# Patient Record
Sex: FEMALE | Race: WHITE | NOT HISPANIC OR LATINO
[De-identification: names, ages, dates, MRNs, and addresses within clinical notes are randomized per-mention and may not be internally consistent; named-entity substitution may affect disease eponyms.]

---

## 2020-07-20 ENCOUNTER — TRANSCRIPTION ENCOUNTER (OUTPATIENT)
Age: 75
End: 2020-07-20

## 2020-07-20 DIAGNOSIS — Z01.812 ENCOUNTER FOR PREPROCEDURAL LABORATORY EXAMINATION: ICD-10-CM

## 2020-07-20 PROBLEM — Z00.00 ENCOUNTER FOR PREVENTIVE HEALTH EXAMINATION: Noted: 2020-07-20

## 2020-08-04 ENCOUNTER — APPOINTMENT (OUTPATIENT)
Dept: PULMONOLOGY | Facility: CLINIC | Age: 75
End: 2020-08-04
Payer: MEDICARE

## 2020-08-04 VITALS
WEIGHT: 153 LBS | RESPIRATION RATE: 17 BRPM | BODY MASS INDEX: 27.11 KG/M2 | DIASTOLIC BLOOD PRESSURE: 70 MMHG | TEMPERATURE: 96 F | SYSTOLIC BLOOD PRESSURE: 130 MMHG | OXYGEN SATURATION: 97 % | HEART RATE: 65 BPM | HEIGHT: 63 IN

## 2020-08-04 DIAGNOSIS — Z86.59 PERSONAL HISTORY OF OTHER MENTAL AND BEHAVIORAL DISORDERS: ICD-10-CM

## 2020-08-04 DIAGNOSIS — Z87.19 PERSONAL HISTORY OF OTHER DISEASES OF THE DIGESTIVE SYSTEM: ICD-10-CM

## 2020-08-04 DIAGNOSIS — E66.3 OVERWEIGHT: ICD-10-CM

## 2020-08-04 DIAGNOSIS — I47.1 SUPRAVENTRICULAR TACHYCARDIA: ICD-10-CM

## 2020-08-04 DIAGNOSIS — Z78.9 OTHER SPECIFIED HEALTH STATUS: ICD-10-CM

## 2020-08-04 DIAGNOSIS — Z82.49 FAMILY HISTORY OF ISCHEMIC HEART DISEASE AND OTHER DISEASES OF THE CIRCULATORY SYSTEM: ICD-10-CM

## 2020-08-04 DIAGNOSIS — Z82.0 FAMILY HISTORY OF EPILEPSY AND OTHER DISEASES OF THE NERVOUS SYSTEM: ICD-10-CM

## 2020-08-04 DIAGNOSIS — R79.89 OTHER SPECIFIED ABNORMAL FINDINGS OF BLOOD CHEMISTRY: ICD-10-CM

## 2020-08-04 DIAGNOSIS — Z86.79 PERSONAL HISTORY OF OTHER DISEASES OF THE CIRCULATORY SYSTEM: ICD-10-CM

## 2020-08-04 DIAGNOSIS — G43.909 MIGRAINE, UNSPECIFIED, NOT INTRACTABLE, W/OUT STATUS MIGRAINOSUS: ICD-10-CM

## 2020-08-04 DIAGNOSIS — Z87.39 PERSONAL HISTORY OF OTHER DISEASES OF THE MUSCULOSKELETAL SYSTEM AND CONNECTIVE TISSUE: ICD-10-CM

## 2020-08-04 DIAGNOSIS — Z86.39 PERSONAL HISTORY OF OTHER ENDOCRINE, NUTRITIONAL AND METABOLIC DISEASE: ICD-10-CM

## 2020-08-04 PROCEDURE — 99204 OFFICE O/P NEW MOD 45 MIN: CPT | Mod: 25

## 2020-08-04 PROCEDURE — 71046 X-RAY EXAM CHEST 2 VIEWS: CPT

## 2020-08-04 PROCEDURE — 94618 PULMONARY STRESS TESTING: CPT

## 2020-08-04 RX ORDER — ATENOLOL 50 MG/1
TABLET ORAL
Refills: 0 | Status: ACTIVE | COMMUNITY

## 2020-08-04 RX ORDER — FLUTICASONE FUROATE AND VILANTEROL TRIFENATATE 100; 25 UG/1; UG/1
100-25 POWDER RESPIRATORY (INHALATION)
Refills: 0 | Status: ACTIVE | COMMUNITY

## 2020-08-04 RX ORDER — GLUCOSAMINE SULFATE 500 MG
CAPSULE ORAL
Refills: 0 | Status: ACTIVE | COMMUNITY

## 2020-08-04 RX ORDER — LANSOPRAZOLE 30 MG/1
30 CAPSULE, DELAYED RELEASE ORAL
Refills: 0 | Status: ACTIVE | COMMUNITY

## 2020-08-04 RX ORDER — ESCITALOPRAM OXALATE 20 MG/1
20 TABLET, FILM COATED ORAL
Refills: 0 | Status: ACTIVE | COMMUNITY

## 2020-08-04 RX ORDER — MONTELUKAST SODIUM 10 MG/1
10 TABLET, FILM COATED ORAL
Refills: 0 | Status: ACTIVE | COMMUNITY

## 2020-08-04 RX ORDER — ERGOCALCIFEROL (VITAMIN D2) 1250 MCG
50000 CAPSULE ORAL
Refills: 0 | Status: ACTIVE | COMMUNITY

## 2020-08-04 RX ORDER — ALBUTEROL SULFATE 90 UG/1
108 (90 BASE) AEROSOL, METERED RESPIRATORY (INHALATION)
Refills: 0 | Status: ACTIVE | COMMUNITY

## 2020-08-04 NOTE — PHYSICAL EXAM
[No Acute Distress] : no acute distress [Normal Oropharynx] : normal oropharynx [Normal Appearance] : normal appearance [No Neck Mass] : no neck mass [Normal Rate/Rhythm] : normal rate/rhythm [Normal S1, S2] : normal s1, s2 [No Murmurs] : no murmurs [No Resp Distress] : no resp distress [Clear to Auscultation Bilaterally] : clear to auscultation bilaterally [No Abnormalities] : no abnormalities [Benign] : benign [Normal Gait] : normal gait [No Clubbing] : no clubbing [No Cyanosis] : no cyanosis [No Edema] : no edema [FROM] : FROM [Normal Color/ Pigmentation] : normal color/ pigmentation [No Focal Deficits] : no focal deficits [Oriented x3] : oriented x3 [Normal Affect] : normal affect [III] : Mallampati Class: III [TextBox_68] : I:E ratio 1:3; clear

## 2020-08-04 NOTE — PROCEDURE
[FreeTextEntry1] : CXR reveals a normal sized heart; no evidence of infiltrate or effusion--a normal appearing chest radiograph \par \par \par 6 minute walk test reveals a low saturation of 97% with no evidence of dyspnea or fatigue; walked  586.8 meters\par

## 2020-08-04 NOTE — ASSESSMENT
[FreeTextEntry1] : Ms. LIBMAN is a 75 year old female with a history of anxiety, thyroid issues, SVT, asthma, GERD, IBS, hypothyroidism, low vitamin d,  bladder insufficiency,  who comes into the office today for pulmonary evaluation for SOB \par \par \par The patient's shortness of breath is multifactorial due to:\par -pulmonary disease \par      - asthma \par -poor breathing mechanics \par -overweight/out of shape\par -?cardiac disease \par \par \par Problem 1: Asthma (on Beta-blocker)\par - Add Trelegy 1 inhalation QD\par - Add Ventolin rescue inhaler 2 inhalations before exercise, Q6H \par - Singulair 10 mg before bed \par -Asthma is believed to be caused by inherited (genetic) and environmental factor, but its exact cause is unknown. Asthma may be triggered by allergens, lung infections, or irritants in the air. Asthma triggers are different for each person\par -Inhaler technique reviewed as well as oral hygiene techniques reviewed with patient. Avoidance of cold air, extremes of temperature, rescue inhaler should be used before exercise. Order of medication reviewed with patient. Recommended use of a cool mist humidifier in the bedroom.\par \par \par \par Problem 2: Allergies / Sinus\par -Add Olopatadine 0.6% at 1 sniff/nostril BID \par -Complete blood work to include: IgE level, eosinophil level, vitamin D level, food IgE level, and asthma profile \par - Environmental measures for allergies were encouraged including mattress and pillow cover, air purifier, and environmental controls.\par \par \par Problem 3: GERD\par -continue Prevacid 30 mg pre-breakfast and dinner \par -Rule of 2s: avoid eating too much, eating too fast, eating too late, eating too spicy, eating too lousy, eating two hours before bed.\par -Things to avoid including overeating, spicy foods, tight clothing, eating within three hours of bed, this list is not all inclusive. \par -For treatment of reflux, possible options discussed including diet control, H2 blockers, PPIs, as well as coating motility agents discussed as treatment options. Timing of meals and proximity of last meal to sleep were discussed. If symptoms persist, a formal gastrointestinal evaluation is needed.\par \par \par Problem 4: Anxiety \par - Recommended the book "The Gift of Maybe" by Ayesha Rodriguez \par \par \par Problem 5: low vitamin D\par - on rx\par Has been associated with asthma exacerbations and increased allergic symptoms. The goal based on recent information is maintaining levels between 50-70 and low normal is 30. Recommended 50,000 units every two weeks to once a month depending on the level.\par \par  \par \par \par Problem 6 : Poor Mechanics of Breathing\par - Proper breathing techniques were reviewed with an emphasis of exhalation. Patient instructed to breath in for 1 second and out for four seconds. Patient was encouraged to not talk while walking. \par \par Problem 7 : Overweight\par -Weight loss, exercise, and diet control were discussed and are highly encouraged. Treatment options were given such as, aqua therapy, and contacting a nutritionist. Recommended to use the elliptical, stationary bike, less use of treadmill. Mindful eating was explained to the patient Obesity is associated with worsening asthma, shortness of breath, and potential for cardiac disease, diabetes, and other underlying medical conditions. \par \par Problem 8 : Cardiac / SVT \par - on beta blocker \par -recommended to follow up with a cardiologist\par \par Problem 9 : Health Maintenance/COVID19 Precautions:\par - Clean your hands often. Wash your hands often with soap and water for at least 20 seconds, especially after blowing your nose, coughing, or sneezing, or having been in a public place.\par - If soap and water are not available, use a hand  that contains at least 60% alcohol.\par - To the extent possible, avoid touching high-touch surfaces in public places - elevator buttons, door handles, handrails, handshaking with people, etc. Use a tissue or your sleeve to cover your hand or finger if you must touch something.\par - Wash your hands after touching surfaces in public places.\par - Avoid touching your face, nose, eyes, etc.\par - Clean and disinfect your home to remove germs: practice routine cleaning of frequently touched surfaces (for example: tables, doorknobs, light switches, handles, desks, toilets, faucets, sinks & cell phones)\par - Avoid crowds, especially in poorly ventilated spaces. Your risk of exposure to respiratory viruses like COVID-19 may increase in crowded, closed-in settings with little air circulation if there are people in the crowd who are sick. All patients are recommended to practice social distancing and stay at least 6 feet away from others.\par - Avoid all non-essential travel including plane trips, and especially avoid embarking on cruise ships.\par -If COVID-19 is spreading in your community, take extra measures to put distance between yourself and other people to further reduce your risk of being exposed to this new virus.\par -Stay home as much as possible.\par - Consider ways of getting food brought to your house through family, social, or commercial networks\par -Be aware that the virus has been known to live in the air up to 3 hours post exposure, cardboard up to 24 hours post exposure, copper up to 4 hours post exposure, steel and plastic up to 2-3 days post exposure. Risk of transmission from these surfaces are affected by many variables.\par Immune Support Recommendations:\par -OTC Vitamin C 500mg BID \par -OTC Quercetin 250-500mg BID \par -OTC Zinc 75-100mg per day \par -OTC Melatonin 1 or 2 mg a night \par -OTC Vitamin D 1-4000mg per day \par -OTC Tonic Water 8oz per day\par Asthma and COVID19:\par You need to make sure your asthma is under control. This often requires the use of inhaled corticosteroids (and sometimes oral corticosteroids). Inhaled corticosteroids do not likely reduce your immune system’s ability to fight infections, but oral corticosteroids may. It is important to use the steps above to protect yourself to limit your exposure to any respiratory virus.\par \par Problem 10 : health maintenance\par -s/p influenza vaccine\par -recommended strep pneumonia vaccines after age 65: Prevnar-13 vaccine, followed by Pneumo vaccine 23 one year following\par -recommended early intervention for URIs\par -recommended regular osteoporosis evaluations\par -recommended early dermatological evaluations\par -recommended after the age of 50 to the age of 70, colonoscopy every 5 years \par \par  Follow up in 6-8 weeks\par -he  is recommended to call with any changes, questions, or concerns.\par

## 2020-08-04 NOTE — HISTORY OF PRESENT ILLNESS
[TextBox_4] : Ms. LIBMAN is a 75 year old female presenting to the office today for initial pulmonary evaluation. Her chief complaint is\par - 7-8 years ago she went to a cardiologist because she kept thinking she was having a heart attack. She went to a pulmonologist and was told she had asthma. She was put on Singulair and then Breo. Her pulmonologist retired so she switched to another one. This pulmonologist told her to stop taking those medications she does not have asthma and that she needs to lose 10 lbs. But she feels she needs the medications. \par - her symptoms are SOB, she always has a heavy feeling on her chest \par - her heart burn / reflux is controlled on Prevacid. \par - She notes she has a tendency to get sinus infections. 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

## 2020-08-04 NOTE — ADDENDUM
[FreeTextEntry1] : Documented by Li Murry acting as a scribe for Dr. Des Tucker on 08/04/2020.\par \par All medical record entries made by the Scribe were at my, Dr. Des Tucker's, direction and personally dictated by me on 08/04/2020. I have reviewed the chart and agree that the record accurately reflects my personal performance of the history, physical exam, assessment and plan. I have also personally directed, reviewed, and agree with the discharge instructions.

## 2020-08-05 ENCOUNTER — APPOINTMENT (OUTPATIENT)
Dept: PULMONOLOGY | Facility: CLINIC | Age: 75
End: 2020-08-05
Payer: MEDICARE

## 2020-08-10 ENCOUNTER — LABORATORY RESULT (OUTPATIENT)
Age: 75
End: 2020-08-10

## 2020-08-10 LAB
24R-OH-CALCIDIOL SERPL-MCNC: 40.1 PG/ML
25(OH)D3 SERPL-MCNC: 57.5 NG/ML
BASOPHILS # BLD AUTO: 0.04 K/UL
BASOPHILS NFR BLD AUTO: 0.5 %
EOSINOPHIL # BLD AUTO: 0.34 K/UL
EOSINOPHIL NFR BLD AUTO: 4.6 %
HCT VFR BLD CALC: 41 %
HGB BLD-MCNC: 13.3 G/DL
IMM GRANULOCYTES NFR BLD AUTO: 0.7 %
LYMPHOCYTES # BLD AUTO: 2.07 K/UL
LYMPHOCYTES NFR BLD AUTO: 28.2 %
MAN DIFF?: NORMAL
MCHC RBC-ENTMCNC: 30.2 PG
MCHC RBC-ENTMCNC: 32.4 GM/DL
MCV RBC AUTO: 93 FL
MONOCYTES # BLD AUTO: 0.49 K/UL
MONOCYTES NFR BLD AUTO: 6.7 %
NEUTROPHILS # BLD AUTO: 4.34 K/UL
NEUTROPHILS NFR BLD AUTO: 59.3 %
PLATELET # BLD AUTO: 183 K/UL
RBC # BLD: 4.41 M/UL
RBC # FLD: 12.7 %
WBC # FLD AUTO: 7.33 K/UL

## 2020-08-10 RX ORDER — OLOPATADINE HYDROCHLORIDE 665 UG/1
0.6 SPRAY, METERED NASAL
Qty: 3 | Refills: 1 | Status: DISCONTINUED | COMMUNITY
Start: 2020-08-04 | End: 2020-08-10

## 2020-08-10 RX ORDER — AZELASTINE HYDROCHLORIDE 137 UG/1
0.1 SPRAY, METERED NASAL TWICE DAILY
Qty: 3 | Refills: 1 | Status: ACTIVE | COMMUNITY
Start: 2020-08-10 | End: 1900-01-01

## 2020-08-11 LAB
A ALTERNATA IGE QN: <0.1 KUA/L
A FUMIGATUS IGE QN: <0.1 KUA/L
C ALBICANS IGE QN: <0.1 KUA/L
C HERBARUM IGE QN: <0.1 KUA/L
CAT DANDER IGE QN: <0.1 KUA/L
CLAM IGE QN: <0.1 KUA/L
CODFISH IGE QN: <0.1 KUA/L
COMMON RAGWEED IGE QN: <0.1 KUA/L
CORN IGE QN: <0.1 KUA/L
COW MILK IGE QN: <0.1 KUA/L
D FARINAE IGE QN: <0.1 KUA/L
D PTERONYSS IGE QN: <0.1 KUA/L
DEPRECATED A ALTERNATA IGE RAST QL: 0
DEPRECATED A FUMIGATUS IGE RAST QL: 0
DEPRECATED C ALBICANS IGE RAST QL: 0
DEPRECATED C HERBARUM IGE RAST QL: 0
DEPRECATED CAT DANDER IGE RAST QL: 0
DEPRECATED CLAM IGE RAST QL: 0
DEPRECATED CODFISH IGE RAST QL: 0
DEPRECATED COMMON RAGWEED IGE RAST QL: 0
DEPRECATED CORN IGE RAST QL: 0
DEPRECATED COW MILK IGE RAST QL: 0
DEPRECATED D FARINAE IGE RAST QL: 0
DEPRECATED D PTERONYSS IGE RAST QL: 0
DEPRECATED DOG DANDER IGE RAST QL: 0
DEPRECATED EGG WHITE IGE RAST QL: 0
DEPRECATED M RACEMOSUS IGE RAST QL: 0
DEPRECATED PEANUT IGE RAST QL: 0
DEPRECATED ROACH IGE RAST QL: 0
DEPRECATED SCALLOP IGE RAST QL: <0.1 KUA/L
DEPRECATED SESAME SEED IGE RAST QL: 0
DEPRECATED SHRIMP IGE RAST QL: 0
DEPRECATED SOYBEAN IGE RAST QL: 0
DEPRECATED TIMOTHY IGE RAST QL: 0
DEPRECATED WALNUT IGE RAST QL: 0
DEPRECATED WHEAT IGE RAST QL: 0
DEPRECATED WHITE OAK IGE RAST QL: 0
DOG DANDER IGE QN: <0.1 KUA/L
EGG WHITE IGE QN: <0.1 KUA/L
M RACEMOSUS IGE QN: <0.1 KUA/L
PEANUT IGE QN: <0.1 KUA/L
ROACH IGE QN: <0.1 KUA/L
SARS-COV-2 N GENE NPH QL NAA+PROBE: NOT DETECTED
SCALLOP IGE QN: 0
SCALLOP IGE QN: <0.1 KUA/L
SESAME SEED IGE QN: <0.1 KUA/L
SOYBEAN IGE QN: <0.1 KUA/L
TIMOTHY IGE QN: <0.1 KUA/L
TOTAL IGE SMQN RAST: 2 KU/L
WALNUT IGE QN: <0.1 KUA/L
WHEAT IGE QN: <0.1 KUA/L
WHITE OAK IGE QN: <0.1 KUA/L

## 2020-08-12 ENCOUNTER — APPOINTMENT (OUTPATIENT)
Dept: PULMONOLOGY | Facility: CLINIC | Age: 75
End: 2020-08-12
Payer: MEDICARE

## 2020-08-12 PROCEDURE — 94010 BREATHING CAPACITY TEST: CPT

## 2020-08-12 PROCEDURE — 94727 GAS DIL/WSHOT DETER LNG VOL: CPT

## 2020-08-12 PROCEDURE — 94729 DIFFUSING CAPACITY: CPT

## 2020-09-14 RX ORDER — FLUTICASONE FUROATE, UMECLIDINIUM BROMIDE AND VILANTEROL TRIFENATATE 100; 62.5; 25 UG/1; UG/1; UG/1
100-62.5-25 POWDER RESPIRATORY (INHALATION)
Qty: 3 | Refills: 1 | Status: DISCONTINUED | COMMUNITY
Start: 2020-08-04 | End: 2020-09-14

## 2020-09-14 RX ORDER — GLYCOPYRROLATE AND FORMOTEROL FUMARATE 9; 4.8 UG/1; UG/1
9-4.8 AEROSOL, METERED RESPIRATORY (INHALATION)
Qty: 3 | Refills: 1 | Status: ACTIVE | COMMUNITY
Start: 2020-09-14 | End: 1900-01-01

## 2020-09-14 RX ORDER — CICLESONIDE 160 UG/1
160 AEROSOL, METERED RESPIRATORY (INHALATION) TWICE DAILY
Qty: 3 | Refills: 1 | Status: ACTIVE | COMMUNITY
Start: 2020-09-14 | End: 1900-01-01

## 2020-09-21 ENCOUNTER — APPOINTMENT (OUTPATIENT)
Dept: PULMONOLOGY | Facility: CLINIC | Age: 75
End: 2020-09-21
Payer: MEDICARE

## 2020-09-21 DIAGNOSIS — Z71.89 OTHER SPECIFIED COUNSELING: ICD-10-CM

## 2020-09-21 DIAGNOSIS — J45.909 UNSPECIFIED ASTHMA, UNCOMPLICATED: ICD-10-CM

## 2020-09-21 DIAGNOSIS — F41.9 ANXIETY DISORDER, UNSPECIFIED: ICD-10-CM

## 2020-09-21 DIAGNOSIS — R79.89 OTHER SPECIFIED ABNORMAL FINDINGS OF BLOOD CHEMISTRY: ICD-10-CM

## 2020-09-21 DIAGNOSIS — R06.02 SHORTNESS OF BREATH: ICD-10-CM

## 2020-09-21 DIAGNOSIS — J30.9 ALLERGIC RHINITIS, UNSPECIFIED: ICD-10-CM

## 2020-09-21 DIAGNOSIS — K21.9 GASTRO-ESOPHAGEAL REFLUX DISEASE W/OUT ESOPHAGITIS: ICD-10-CM

## 2020-09-21 PROCEDURE — 99214 OFFICE O/P EST MOD 30 MIN: CPT | Mod: 95

## 2020-09-21 RX ORDER — GLYCOPYRROLATE AND FORMOTEROL FUMARATE 9; 4.8 UG/1; UG/1
9-4.8 AEROSOL, METERED RESPIRATORY (INHALATION)
Qty: 3 | Refills: 1 | Status: ACTIVE | COMMUNITY
Start: 2020-09-21 | End: 1900-01-01

## 2020-09-21 RX ORDER — CICLESONIDE 160 UG/1
160 AEROSOL, METERED RESPIRATORY (INHALATION) TWICE DAILY
Qty: 3 | Refills: 1 | Status: ACTIVE | COMMUNITY
Start: 2020-09-21 | End: 1900-01-01

## 2020-09-21 NOTE — ADDENDUM
[FreeTextEntry1] : Documented by Geovany Laboy acting as a scribe for Dr. Des Tucker on 09/21/2020.\par \par All medical record entries made by the Scribe were at my, Dr. Des Tucker's, direction and personally dictated by me on 09/21/2020 . I have reviewed the chart and agree that the record accurately reflects my personal performance of the history, physical exam, assessment and plan. I have also personally directed, reviewed, and agree with the discharge instructions. \par

## 2020-09-21 NOTE — ASSESSMENT
[FreeTextEntry1] : Ms. LIBMAN is a 75 year old female with a history of anxiety, thyroid issues, SVT, asthma, GERD, IBS, hypothyroidism, low vitamin d,  bladder insufficiency,  who video calls into the office today for pulmonary evaluation intolerant of Trelegy- now on other meds\par \par \par The patient's shortness of breath is multifactorial due to:\par -pulmonary disease \par      - asthma \par -poor breathing mechanics \par -overweight/out of shape\par -?cardiac disease \par \par \par Problem 1: Asthma (on Beta-blocker)\par -add Bevespi 2 puffs BID\par -add Alvesco 160 2 puffs BID\par - continue Ventolin rescue inhaler 2 inhalations before exercise, Q6H \par - continue Singulair 10 mg before bed \par -Asthma is believed to be caused by inherited (genetic) and environmental factor, but its exact cause is unknown. Asthma may be triggered by allergens, lung infections, or irritants in the air. Asthma triggers are different for each person\par -Inhaler technique reviewed as well as oral hygiene techniques reviewed with patient. Avoidance of cold air, extremes of temperature, rescue inhaler should be used before exercise. Order of medication reviewed with patient. Recommended use of a cool mist humidifier in the bedroom.\par \par Problem 1A: Eosinophilic Asthma\par -Candidate for Biologics (Fasenra, Nucala, Dupixent)\par \par Problem 2: Allergies / Sinus\par -continue Olopatadine 0.6% at 1 sniff/nostril BID \par -s/p blood work to include: (-)IgE level, (-)eosinophil level, (-)vitamin D level, (-)food IgE level, (-) and asthma profile \par - Environmental measures for allergies were encouraged including mattress and pillow cover, air purifier, and environmental controls.\par \par Problem 3: GERD\par -continue Prevacid 30 mg pre-breakfast and dinner \par -Rule of 2s: avoid eating too much, eating too fast, eating too late, eating too spicy, eating too lousy, eating two hours before bed.\par -Things to avoid including overeating, spicy foods, tight clothing, eating within three hours of bed, this list is not all inclusive. \par -For treatment of reflux, possible options discussed including diet control, H2 blockers, PPIs, as well as coating motility agents discussed as treatment options. Timing of meals and proximity of last meal to sleep were discussed. If symptoms persist, a formal gastrointestinal evaluation is needed.\par \par Problem 4: Anxiety \par - Recommended the book "The Gift of Maybe" by Ayesha Rodriguez \par \par Problem 5: low vitamin D\par - on rx\par Has been associated with asthma exacerbations and increased allergic symptoms. The goal based on recent information is maintaining levels between 50-70 and low normal is 30. Recommended 50,000 units every two weeks to once a month depending on the level.\par \par Problem 6 : Poor Mechanics of Breathing\par - Proper breathing techniques were reviewed with an emphasis of exhalation. Patient instructed to breath in for 1 second and out for four seconds. Patient was encouraged to not talk while walking. \par \par Problem 7 : Overweight\par -Weight loss, exercise, and diet control were discussed and are highly encouraged. Treatment options were given such as, aqua therapy, and contacting a nutritionist. Recommended to use the elliptical, stationary bike, less use of treadmill. Mindful eating was explained to the patient Obesity is associated with worsening asthma, shortness of breath, and potential for cardiac disease, diabetes, and other underlying medical conditions. \par \par Problem 8 : Cardiac / SVT \par - on beta blocker \par -recommended to follow up with a cardiologist\par \par Problem 9 : Health Maintenance/COVID19 Precautions:\par - Clean your hands often. Wash your hands often with soap and water for at least 20 seconds, especially after blowing your nose, coughing, or sneezing, or having been in a public place.\par - If soap and water are not available, use a hand  that contains at least 60% alcohol.\par - To the extent possible, avoid touching high-touch surfaces in public places - elevator buttons, door handles, handrails, handshaking with people, etc. Use a tissue or your sleeve to cover your hand or finger if you must touch something.\par - Wash your hands after touching surfaces in public places.\par - Avoid touching your face, nose, eyes, etc.\par - Clean and disinfect your home to remove germs: practice routine cleaning of frequently touched surfaces (for example: tables, doorknobs, light switches, handles, desks, toilets, faucets, sinks & cell phones)\par - Avoid crowds, especially in poorly ventilated spaces. Your risk of exposure to respiratory viruses like COVID-19 may increase in crowded, closed-in settings with little air circulation if there are people in the crowd who are sick. All patients are recommended to practice social distancing and stay at least 6 feet away from others.\par - Avoid all non-essential travel including plane trips, and especially avoid embarking on cruise ships.\par -If COVID-19 is spreading in your community, take extra measures to put distance between yourself and other people to further reduce your risk of being exposed to this new virus.\par -Stay home as much as possible.\par - Consider ways of getting food brought to your house through family, social, or commercial networks\par -Be aware that the virus has been known to live in the air up to 3 hours post exposure, cardboard up to 24 hours post exposure, copper up to 4 hours post exposure, steel and plastic up to 2-3 days post exposure. Risk of transmission from these surfaces are affected by many variables.\par Immune Support Recommendations:\par -OTC Vitamin C 500mg BID \par -OTC Quercetin 250-500mg BID \par -OTC Zinc 75-100mg per day \par -OTC Melatonin 1 or 2 mg a night \par -OTC Vitamin D 1-4000mg per day \par -OTC Tonic Water 8oz per day\par Asthma and COVID19:\par You need to make sure your asthma is under control. This often requires the use of inhaled corticosteroids (and sometimes oral corticosteroids). Inhaled corticosteroids do not likely reduce your immune system’s ability to fight infections, but oral corticosteroids may. It is important to use the steps above to protect yourself to limit your exposure to any respiratory virus.\par \par Problem 10 : health maintenance\par -s/p influenza vaccine (pending 2020)\par -recommended strep pneumonia vaccines after age 65: Prevnar-13 vaccine, followed by Pneumo vaccine 23 one year following\par -recommended early intervention for URIs\par -recommended regular osteoporosis evaluations\par -recommended early dermatological evaluations\par -recommended after the age of 50 to the age of 70, colonoscopy every 5 years \par \par  Follow up in 6-8 weeks\par -he  is recommended to call with any changes, questions, or concerns.\par

## 2020-09-21 NOTE — HISTORY OF PRESENT ILLNESS
[Home] : at home, [unfilled] , at the time of the visit. [Medical Office: ([facility])___] : at the medical office located in  [Verbal consent obtained from patient] : the patient, [unfilled] [TextBox_4] : Ms. LIBMAN is a 75 year old female video calling to the office today for pulmonary follow up. Her chief complaint is\par \par -she notes not tolerating Trelegy well with residual Thrush\par -she notes now off Trelegy\par -she notes on Bevespi and awaiting approval for second inhaler\par -she notes more attentive to inhaler use hygiene, gargling and spitting\par -she denies visual issues\par -she notes exercising walking 3 times per week\par -she notes sleeping well\par -she notes flu shot 2020 pending this week\par -she notes energy levels 8/10 on average\par -she notes currently on plant based diet\par -she notes weight down intentionally\par \par -denies any headaches, nausea, vomiting, fever, chills, sweats, chest pain, chest pressure, diarrhea, constipation, dysphagia, dizziness, leg swelling, leg pain, myalgias, arthralgias, itchy eyes, itchy ears, heartburn, reflux, or sour taste in the mouth.\par \par

## 2022-07-03 ENCOUNTER — APPOINTMENT (OUTPATIENT)
Dept: ORTHOPEDIC SURGERY | Facility: CLINIC | Age: 77
End: 2022-07-03

## 2022-07-03 VITALS — BODY MASS INDEX: 27.11 KG/M2 | WEIGHT: 153 LBS | HEIGHT: 63 IN

## 2022-07-03 PROCEDURE — 73030 X-RAY EXAM OF SHOULDER: CPT | Mod: LT

## 2022-07-03 PROCEDURE — 99203 OFFICE O/P NEW LOW 30 MIN: CPT

## 2022-07-03 PROCEDURE — L3670: CPT

## 2022-07-03 NOTE — PHYSICAL EXAM
[4 ___] : forward flexion 4[unfilled]/5 [3___] : external rotation 3[unfilled]/5 [] : positive shoulder apprehension [Left] : left shoulder [There are no fractures, subluxations or dislocations. No significant abnormalities are seen] : There are no fractures, subluxations or dislocations. No significant abnormalities are seen [[id number]] : active forward flexion 140 degrees [de-identified] : external rotation 45 degrees

## 2022-07-03 NOTE — ASSESSMENT
[FreeTextEntry1] : Xrays reviewed with patient\par Treatment options discussed\par Tramadol sent for pain and inflammation\par MRI L shoulder to eval cuff tear\par Ultrasling medically necessary for stability/comfort\par Follow up with Dr. Granados  to review MRI and for further treatment options

## 2022-07-03 NOTE — HISTORY OF PRESENT ILLNESS
[9] : 9 [de-identified] : 7/3/22: Patient is a 76 yo RHD female c/o left shoulder pain for 2 days after she fell. Dr was with EMS and reduced shoulder. Went to [facility] where they took xrays. No n/t. Taking tylenol. She is on Eliquis. No previous injuries or surgeries to left shoulder. Hx of right rotator cuff surgery with Dr. Avalos.  [FreeTextEntry5] : pt fell on Friday and injured lt shoulder, pt states she went to [facility] and states it was dislocated \par

## 2022-07-04 ENCOUNTER — FORM ENCOUNTER (OUTPATIENT)
Age: 77
End: 2022-07-04

## 2022-07-05 ENCOUNTER — APPOINTMENT (OUTPATIENT)
Dept: MRI IMAGING | Facility: CLINIC | Age: 77
End: 2022-07-05

## 2022-07-05 PROCEDURE — 73221 MRI JOINT UPR EXTREM W/O DYE: CPT | Mod: LT

## 2022-07-07 ENCOUNTER — APPOINTMENT (OUTPATIENT)
Dept: ORTHOPEDIC SURGERY | Facility: CLINIC | Age: 77
End: 2022-07-07

## 2022-07-07 VITALS — BODY MASS INDEX: 30.91 KG/M2 | WEIGHT: 168 LBS | HEIGHT: 62 IN

## 2022-07-07 PROCEDURE — 99214 OFFICE O/P EST MOD 30 MIN: CPT

## 2022-07-07 NOTE — PHYSICAL EXAM
[Orientated] : orientated [Able to Communicate] : able to communicate [Normal Skin] : normal skin [Left] : left shoulder [Sitting] : sitting [Moderate] : moderate [] : no sensory deficits [de-identified] : Pain with FF and ER

## 2022-07-07 NOTE — DISCUSSION/SUMMARY
[de-identified] : The documentation recorded by the scribe accurately reflects the service I personally performed and the decisions made by me.\par I, Leonie Guadarrama, attest that this documentation has been prepared under the direction and in the presence of Provider Pedro Granados MD.\par \par The patient was seen by Pedro Granados MD\par

## 2022-07-07 NOTE — DATA REVIEWED
[MRI] : MRI [Left] : left [Shoulder] : shoulder [Report was reviewed and noted in the chart] : The report was reviewed and noted in the chart [I reviewed the films/CD and agree] : I reviewed the films/CD and agree [FreeTextEntry1] : Impression: \par 1. Findings suspicious for acute traumatic injury with possible HAGL lesion as well as high-grade partial \par tearing of the subscapularis tendon insertion, moderate grade partial tearing of the supraspinatus tendon \par insertion, and dislocation of the biceps tendon medially with extensive labral tearing large effusion, synovitis \par and capsulitis with soft tissue swelling and bursitis anteriorly as well as in the subacromial/subdeltoid bursa.\par 2. Moderate AC joint arthrosis, lateral acromial bone spurs and infraspinatus tendinopathy without tear.\par 3. No marrow edema in the humeral head or glenoid.\par 4. Clinical correlation regarding anterior and inferior glenohumeral joint instability is recommended.\par Date of Dictation 07/06/2022/Electronically signed by Ravi Simmons MD 07/06/2022 3:50:19 PM

## 2022-07-07 NOTE — ASSESSMENT
[FreeTextEntry1] : Underlying pathology reviewed and treatment options discussed. \par Xrays of the left shoulder from 07/3/22 normal located shoulder. \par MRI of the left shoulder reviewed and discussed with  present. \par Start PT and HEP to improve mechanics and reduce pain to prevent stiffness. \par OTC Nsaids as needed.\par RTO 3-4 wks. \par sling protocol discussed. \par Questions addressed.\par

## 2022-07-07 NOTE — HISTORY OF PRESENT ILLNESS
[Left Arm] : left arm [Sudden] : sudden [7] : 7 [0] : 0 [Dull/Aching] : dull/aching [Localized] : localized [Intermittent] : intermittent [Leisure] : leisure [Sleep] : sleep [Rest] : rest [de-identified] : DOI 7/1/22\par 7/7/22: left shoulder dislocaion on 7/1/22. Went to ER had the shoulder reduced. Went to [facility]. hx of right RCR with Dr. Avalos \par \par \par From [facility]. 7/3/22: Patient is a 76 yo RHD female c/o left shoulder pain for 2 days after she fell. Dr was with EMS and reduced shoulder. Went to [facility] where they took xrays. No n/t. Taking tylenol. She is on Eliquis. No previous injuries or surgeries to left shoulder. Hx of right rotator cuff surgery with Dr. Avalos. \par  [] : Post Surgical Visit: no [FreeTextEntry1] : left shoulder [FreeTextEntry3] : 7/1/22 [FreeTextEntry5] : Patient dislocated her shoulder when she fell [de-identified] : [facility] [de-identified] : XR

## 2022-07-20 ENCOUNTER — APPOINTMENT (OUTPATIENT)
Dept: ORTHOPEDIC SURGERY | Facility: CLINIC | Age: 77
End: 2022-07-20

## 2022-07-20 VITALS — WEIGHT: 168 LBS | BODY MASS INDEX: 30.91 KG/M2 | HEIGHT: 62 IN

## 2022-07-20 DIAGNOSIS — S46.812A STRAIN OF OTHER MUSCLES, FASCIA AND TENDONS AT SHOULDER AND UPPER ARM LEVEL, LEFT ARM, INITIAL ENCOUNTER: ICD-10-CM

## 2022-07-20 DIAGNOSIS — M19.012 PRIMARY OSTEOARTHRITIS, LEFT SHOULDER: ICD-10-CM

## 2022-07-20 DIAGNOSIS — S43.005A UNSPECIFIED DISLOCATION OF LEFT SHOULDER JOINT, INITIAL ENCOUNTER: ICD-10-CM

## 2022-07-20 DIAGNOSIS — S43.492A OTHER SPRAIN OF LEFT SHOULDER JOINT, INITIAL ENCOUNTER: ICD-10-CM

## 2022-07-20 PROCEDURE — 99213 OFFICE O/P EST LOW 20 MIN: CPT

## 2022-07-20 RX ORDER — TRAMADOL HYDROCHLORIDE 50 MG/1
50 TABLET, COATED ORAL
Qty: 10 | Refills: 0 | Status: ACTIVE | COMMUNITY
Start: 2022-07-03 | End: 1900-01-01

## 2022-07-20 NOTE — HISTORY OF PRESENT ILLNESS
[5] : 5 [0] : 0 [de-identified] : DOI 7/1/22\par 7/7/22: left shoulder dislocaion on 7/1/22. Went to ER had the shoulder reduced. Went to [facility]. hx of right RCR with Dr. Avalos \par \par \par From [facility]. 7/3/22: Patient is a 78 yo RHD female c/o left shoulder pain for 2 days after she fell. Dr was with EMS and reduced shoulder. Went to [facility] where they took xrays. No n/t. Taking tylenol. She is on Eliquis. No previous injuries or surgeries to left shoulder. Hx of right rotator cuff surgery with Dr. Avalos. \par  [FreeTextEntry1] : left shoulder  [de-identified] : PT

## 2022-07-20 NOTE — DISCUSSION/SUMMARY
[de-identified] : The documentation recorded by the scribe accurately reflects the service I personally performed and the decisions made by me.\par I, Leonie Guadarrama, attest that this documentation has been prepared under the direction and in the presence of Provider Pedro Granados MD.\par \par The patient was seen by Pedro Granados MD\par

## 2022-07-20 NOTE — ASSESSMENT
[FreeTextEntry1] : Underlying pathology reviewed and treatment options discussed. \par Xrays of the left shoulder from 07/3/22 normal located shoulder. \par MRI of the left shoulder reviewed and discussed with  present. \par Start PT and HEP to improve mechanics and reduce pain to prevent stiffness. \par OTC Nsaids as needed.\par RTO 3-4 wks. \par sling protocol discussed. \par Questions addressed.\par \par 7/20/22: Starting PT soon. \par c/o of clicking and pain in the morning.\par no repeat sublux episodes. \par Progressing well. \par Start PT/Home excerise program. \par Requested tramadol to premedicate for PT. \par prescribed tramadol. \par Questions answered with  present.

## 2022-07-20 NOTE — PHYSICAL EXAM
[Orientated] : orientated [Able to Communicate] : able to communicate [Normal Skin] : normal skin [Left] : left shoulder [Sitting] : sitting [Moderate] : moderate [] : no sensory deficits [de-identified] : Pain with FF and ER

## 2022-07-20 NOTE — REASON FOR VISIT
[FreeTextEntry2] : pt is here for follow up of left shoulder. pain level is better. She reports reoccurring clicking. No repeat dislocations. Pain in the morning. \par

## 2022-07-27 ENCOUNTER — APPOINTMENT (OUTPATIENT)
Dept: ORTHOPEDIC SURGERY | Facility: CLINIC | Age: 77
End: 2022-07-27

## 2024-06-03 ENCOUNTER — EMERGENCY (EMERGENCY)
Facility: HOSPITAL | Age: 79
LOS: 1 days | Discharge: HOME | End: 2024-06-03
Admitting: EMERGENCY MEDICINE
Payer: MEDICARE

## 2024-06-03 VITALS
HEART RATE: 60 BPM | TEMPERATURE: 98 F | SYSTOLIC BLOOD PRESSURE: 118 MMHG | OXYGEN SATURATION: 100 % | RESPIRATION RATE: 18 BRPM | DIASTOLIC BLOOD PRESSURE: 60 MMHG

## 2024-06-03 VITALS
OXYGEN SATURATION: 96 % | RESPIRATION RATE: 18 BRPM | TEMPERATURE: 98 F | DIASTOLIC BLOOD PRESSURE: 62 MMHG | SYSTOLIC BLOOD PRESSURE: 120 MMHG | HEART RATE: 70 BPM

## 2024-06-03 LAB
ALBUMIN SERPL ELPH-MCNC: 4 G/DL — SIGNIFICANT CHANGE UP (ref 3.3–5)
ALP SERPL-CCNC: 73 U/L — SIGNIFICANT CHANGE UP (ref 40–120)
ALT FLD-CCNC: 8 U/L — SIGNIFICANT CHANGE UP (ref 4–33)
ANION GAP SERPL CALC-SCNC: 14 MMOL/L — SIGNIFICANT CHANGE UP (ref 7–14)
AST SERPL-CCNC: 9 U/L — SIGNIFICANT CHANGE UP (ref 4–32)
BILIRUB SERPL-MCNC: 0.3 MG/DL — SIGNIFICANT CHANGE UP (ref 0.2–1.2)
BUN SERPL-MCNC: 13 MG/DL — SIGNIFICANT CHANGE UP (ref 7–23)
CALCIUM SERPL-MCNC: 9 MG/DL — SIGNIFICANT CHANGE UP (ref 8.4–10.5)
CHLORIDE SERPL-SCNC: 107 MMOL/L — SIGNIFICANT CHANGE UP (ref 98–107)
CO2 SERPL-SCNC: 20 MMOL/L — LOW (ref 22–31)
CREAT SERPL-MCNC: 0.81 MG/DL — SIGNIFICANT CHANGE UP (ref 0.5–1.3)
EGFR: 74 ML/MIN/1.73M2 — SIGNIFICANT CHANGE UP
GLUCOSE SERPL-MCNC: 68 MG/DL — LOW (ref 70–99)
HCT VFR BLD CALC: 37.2 % — SIGNIFICANT CHANGE UP (ref 34.5–45)
HGB BLD-MCNC: 11.7 G/DL — SIGNIFICANT CHANGE UP (ref 11.5–15.5)
MCHC RBC-ENTMCNC: 23.6 PG — LOW (ref 27–34)
MCHC RBC-ENTMCNC: 31.5 GM/DL — LOW (ref 32–36)
MCV RBC AUTO: 75 FL — LOW (ref 80–100)
NRBC # BLD: 0 /100 WBCS — SIGNIFICANT CHANGE UP (ref 0–0)
NRBC # FLD: 0 K/UL — SIGNIFICANT CHANGE UP (ref 0–0)
PLATELET # BLD AUTO: 184 K/UL — SIGNIFICANT CHANGE UP (ref 150–400)
POTASSIUM SERPL-MCNC: 4.4 MMOL/L — SIGNIFICANT CHANGE UP (ref 3.5–5.3)
POTASSIUM SERPL-SCNC: 4.4 MMOL/L — SIGNIFICANT CHANGE UP (ref 3.5–5.3)
PROT SERPL-MCNC: 6.3 G/DL — SIGNIFICANT CHANGE UP (ref 6–8.3)
RBC # BLD: 4.96 M/UL — SIGNIFICANT CHANGE UP (ref 3.8–5.2)
RBC # FLD: 18.7 % — HIGH (ref 10.3–14.5)
SODIUM SERPL-SCNC: 141 MMOL/L — SIGNIFICANT CHANGE UP (ref 135–145)
WBC # BLD: 12.36 K/UL — HIGH (ref 3.8–10.5)
WBC # FLD AUTO: 12.36 K/UL — HIGH (ref 3.8–10.5)

## 2024-06-03 PROCEDURE — 70487 CT MAXILLOFACIAL W/DYE: CPT | Mod: 26,MC

## 2024-06-03 PROCEDURE — 99285 EMERGENCY DEPT VISIT HI MDM: CPT

## 2024-06-03 RX ORDER — CHLORHEXIDINE GLUCONATE 213 G/1000ML
15 SOLUTION TOPICAL
Qty: 1 | Refills: 0
Start: 2024-06-03 | End: 2024-06-09

## 2024-06-03 RX ORDER — ONDANSETRON 8 MG/1
1 TABLET, FILM COATED ORAL
Qty: 1 | Refills: 0
Start: 2024-06-03

## 2024-06-03 RX ADMIN — Medication 100 MILLIGRAM(S): at 17:17

## 2024-06-03 NOTE — ED ADULT NURSE NOTE - OBJECTIVE STATEMENT
Pt with right side of face swelling s/p dental implant . Pt with no fever iv placed medicated for pain awaiting ct and dental eval.

## 2024-06-03 NOTE — ED PROVIDER NOTE - CLINICAL SUMMARY MEDICAL DECISION MAKING FREE TEXT BOX
Patient is 77yo F PMHx afib s/p watchman procedure, loop recorder, asthma presents with c/o right sided dental pain x2 weeks, worse x3 days now with redness and swelling, s/p dental implant on 5/1.  Patient denies fevers or chills, difficulty swallowing, throat pain, eye pain or vision changes, HA, dizziness, CP, or SOB. Patient is well appearing, NAD. Physical exam pertinent for swelling to right side of face from eye to jaw with erythema, right maxillary sinus TTP, right upper jaw TTP. Tooth #6 new implant with erythema and edema of the surrounding gums, tooth # 5 absent. Patient airway patent, clearing her own secretions, no difficulty swallowing, Uvula and tongue midline, tonsils not enlarged and without exudate, no edema to the posterior oropharynx. Sclera clear, no eye pain with movement or vision changes. Patient likely with dental abscess 2/2 dental implant. Less concern for Rl's angina, peritonsil abscess, or retropharyngeal abscess in the setting of no throat pain, no edema or involvement of the posterior oropharynx. Will order CBC to assess for infection, CMP, and CT Maxillofacial with IV contrast to r/o abscess. Patient disposition pending CT scan results, plan to consult dental team after CT scan is performed. DUYEN Alvarez NP Fellow: Patient is 79yo F PMHx afib s/p watchman procedure, loop recorder, asthma presents with c/o right sided dental pain x2 weeks, worse x3 days now with redness and swelling, s/p dental implant on 5/1.  Patient denies fevers or chills, difficulty swallowing, throat pain, eye pain or vision changes, HA, dizziness, CP, or SOB. Patient is well appearing, NAD. Physical exam pertinent for swelling to right side of face from eye to jaw with erythema, right maxillary sinus TTP, right upper jaw TTP. Tooth #6 new implant with erythema and edema of the surrounding gums, tooth # 5 absent. Patient airway patent, clearing her own secretions, no difficulty swallowing, Uvula and tongue midline, tonsils not enlarged and without exudate, no edema to the posterior oropharynx. Sclera clear, no eye pain with movement or vision changes. Patient likely with dental abscess 2/2 dental implant. Less concern for Rl's angina, peritonsil abscess, or retropharyngeal abscess in the setting of no throat pain, no edema or involvement of the posterior oropharynx. Will order CBC to assess for infection, CMP, and CT Maxillofacial with IV contrast to r/o abscess. Patient disposition pending CT scan results, plan to consult dental team after CT scan is performed.

## 2024-06-03 NOTE — ED PROVIDER NOTE - NSFOLLOWUPINSTRUCTIONS_ED_ALL_ED_FT
Dental Abscess  WHAT YOU NEED TO KNOW:  What is a dental abscess? A dental abscess is a collection of pus in or around a tooth. A dental abscess is caused by bacteria. The bacteria usually enter the tooth when the enamel (outer part of the tooth) is damaged by tooth decay. Bacteria may also enter the tooth through a break or chip in the tooth, or a cut in the gum. Food particles that are stuck between the teeth for a long time may also lead to an abscess.   What increases my risk for a dental abscess?   •Poor tooth care  •Medical conditions, such as diabetes, gastric reflux, or diseases that weaken the immune system   •Procedures on the tooth or the gums  •Dry mouth or very little saliva   •Smoking or drinking alcohol  •Radiation therapy of the head and neck  •Certain medicines, such as steroids, allergy, or blood pressure medicines  What are the signs and symptoms of a dental abscess?   •Toothache, a loose tooth, or a tooth that is very sensitive to pressure or temperature  •Bad breath, unpleasant taste, and drooling  •Fever  •Pain, redness, and swelling of the gums, or swelling of your face and neck  •Pain when you open or close your mouth  •Trouble opening your mouth  How is a dental abscess diagnosed? Your healthcare provider will examine your teeth and gums. He or she will check for pus, redness, swelling, or a mass. You may need an x-ray to check for infection in deeper tissues or broken teeth.   How is a dental abscess treated? Treatment helps treat your abscess and prevent more serious problems.  •Medicines may be given to treat a bacterial infection and decrease pain.   •Incision and drainage is a cut in the abscess to allow the pus to drain. A sample of fluid may be collected from your abscess. The fluid is sent to a lab and tested for bacteria. Ask your healthcare provider for more information.  •A root canal is a procedure to remove the bacteria and prevent more infection. It is usually done after an incision and drainage. A filling or crown will be placed over the tooth after you have healed from your root canal.   •Tooth removal may be needed if the infection affects deeper tissues. This is usually done after an incision and drainage.   What can I do to care for myself?   •Rinse your mouth every 2 hours with salt water. This will help keep the area clean.   •Gently brush your teeth twice a day with a soft tooth brush. This will help keep the area clean.   •Eat soft foods as directed. Soft foods may cause less pain. Examples include applesauce, yogurt, and cooked pasta. Ask your healthcare provider how long to follow this instruction.   •Apply a warm compress to your tooth or gum. Use a cotton ball or gauze soaked in warm water. Remove the compress in 10 minutes or when it becomes cool. Repeat 3 times a day.   What can I do to prevent another dental abscess?   •Brush your teeth at least 2 times a day with fluoride toothpaste.  •Use dental floss to clean between your teeth at least once a day.  •Rinse your mouth with water or mouthwash after meals and snacks.   •Chew sugarless gum after meals and snacks.  •Limit foods that are sticky and high in sugar such as raisons. Also limit drinks high in sugar, such as soda.   •See your dentist every 6 months for dental cleanings and oral exams.  When should I seek immediate care?   •You have severe pain.  •You have trouble breathing because of pain or swelling.  When should I contact my healthcare provider?   •Your symptoms get worse, even after treatment.  •Your mouth is bleeding.  •You cannot eat or drink because of pain or swelling.  •Your abscess returns.  •You have an injury that causes a crack in your tooth.  •You have questions or concerns about your condition or care.  CARE AGREEMENT:  You have the right to help plan your care. Learn about your health condition and how it may be treated. Discuss treatment options with your healthcare providers to decide what care you want to receive. You always have the right to refuse treatment.   Seguimiento con  [facility]  [phone] [place] [address], [place] [address] [phone]    Absceso dental  LO QUE NECESITA SABER:  ¿Qué es un absceso dental?Un absceso dental es la acumulación de pus adentro o alrededor de un diente. La bacteria es la causa de un absceso dental. Las bacterias habitualmente entran al diente cuando el esmalte (parte exterior del diente) se daña debido a la caries dental. Las bacterias también pueden entrar el diente a través de pedro luis rotura o astillamiento en el diente, o un chandu en la encía. Las partículas de alimento que se quedan atoradas entre los dientes por un tiempo extendido podrían también llevar a la formación de un absceso.  ¿Qué aumenta mi riesgo de un absceso dental?  •Tiffanie higiene dental  •Condiciones médicas, grzegorz la diabetes, el reflujo gastroesofágico o enfermedades que debilitan el sistema inmune  •Procedimientos en el diente o las encías  •Boca seca o muy poca saliva  •Fumar o beber alcohol  •La terapia de radiación de la sheila o del latoya.  •Algunos medicamentos grzegorz los esteroides o los medicamentos para las alergias o para la presión arterial  ¿Cuáles son los signos y síntomas de un absceso dental?  •Dolor de muela, aflojamiento dental o algún diente que se encuentre muy sensible a la presión o temperatura  •Mal aliento, sabor desagradable y babeo  •Fiebre o escalofríos  •Dolor, enrojecimiento e inflamación en las encías o inflamación del latoya o la anabella  •Dolor o dificultad cuando abre o rosy la boca  •Dificultad para abrir la boca  ¿Cómo se diagnostica un absceso dental?[place] médico le revisará annamaria dientes y encías. Revisará si hay pus, enrojecimiento, hinchazón o pedro luis masa. Es posible que necesite pedro luis radiografía para verificar si hay infección en tejidos más profundos o dientes rotos.  ¿Cuál es el tratamiento para un absceso dental?El tratamiento ayuda a tratar el absceso y prevenir problemas más serios.  •Los medicamentospara tratar pedro luis infección bacteriana y reducir el dolor.  •Incisión y drenajees un chandu en el absceso que permite que el pus drene. Se puede recoger pedro luis muestra de líquido de kearney absceso. [place] líquido se envía al laboratorio donde se le realizan pruebas para detectar bacterias. Pídale a kearney médico más información.  •Un conducto radiculares un procedimiento para eliminar las bacterias y prevenir más infecciones. Generalmente se realiza después de pedro luis incisión y un drenaje. Se colocará un empaste o pedro luis corona sobre el diente después de que haya sanado kearney conducto radicular.  •La extracción de dientepuede ser necesaria si la infección afecta tejidos más profundos. [place] se hace generalmente después de pedro luis incisión y un drenaje.  ¿Cómo puedo cuidarme?  •Enjuáguese la boca cada 2 horas con agua salina.[place] ayudará a mantener el área limpia.  •[place] suavemente annamaria dientes dos veces al día con un cepillo de dientes suave.[place] ayudará a mantener el área limpia.  •Coma alimentos blandos grzegorz se le indique.Los alimentos blandos pueden causar menos dolor. Por ejemplo, compota de manzana, yogur y pasta cocida. Pregunte a kearney médico por cuánto tiempo necesita seguir estas indicaciones.  •Aplique pedro luis compresa caliente en el diente o la encía.Utilice pedro luis rodríguez de algodón o pedro luis gasa empapada en agua tibia. Quite la compresa en 10 minutos o cuando se enfríe. [place] esto 3 veces al día.  ¿Qué puedo hacer para evitar otro absceso dental?  •[place] los dientes al menos 2 veces al día con pedro luis pasta de dientes con fluoruro.  •Use hilo dental para limpiar entre los dientes al menos pedro luis vez al día.  •Enjuague la boca con agua o con enjuague bucal después de cada comida y merienda.  •Mastique chicle sin azúcar después de cada comida y merienda.  •Limite los alimentos que son pegajosos y altos en azúcar, grzegorz pasas de uva. También limite las bebidas altas en azúcar, grzegorz los refrescos.  •Visite a kearney dentista cada 6 meses para limpiezas dentales y exámenes orales.  ¿Cuándo jody buscar atención inmediata?  •Usted tiene dolor intenso.  •Usted tiene dificultad para respirar a causa del dolor o la inflamación.  ¿Cuándo jody comunicarme con mi médico?  •Annamaria síntomas empeoran, aún después del tratamiento.  •Sangra kearney boca.  •No puede comer o beber a causa del dolor o la inflamación.  •El absceso se vuelve a formar.  •Usted tiene pedro luis lesión que causa pedro luis grieta en kearney diente.  •Usted tiene preguntas o inquietudes acerca de kearney condición o cuidado.  ACUERDOS SOBRE KEARNEY CUIDADO:  Usted tiene el derecho de ayudar a planear kearney cuidado. Aprenda todo lo que pueda sobre kearney condición y grzegorz darle tratamiento. Discuta annamaria opciones de tratamiento con annamaria médicos para decidir el cuidado que usted desea recibir. Usted siempre tiene el derecho de rechazar el tratamiento.

## 2024-06-03 NOTE — ED ADULT TRIAGE NOTE - ARRIVAL FROM
Thank you for choosing Landy Rodríguez PA-C at [facility]. It is a pleasure to be a part of your healthcare team.     Please let us know if you need anything. You may contact us at [phone] for any questions or concerns.    Please call Centralized Patient Scheduling should you need to schedule any imaging at [phone].     You may also receive a short, electronic survey from [facility] about this visit. We would appreciate your feedback.     Have a great day!     Your care providers,     QUIN Waddell MA Julie Chisholm, MA    Home

## 2024-06-03 NOTE — ED PROVIDER NOTE - NSICDXPASTMEDICALHX_GEN_ALL_CORE_FT
PAST MEDICAL HISTORY:  Asthma     Atrial fibrillation     History of loop recorder     Presence of Watchman left atrial appendage closure device

## 2024-06-03 NOTE — ED PROVIDER NOTE - PHYSICAL EXAMINATION
CONSTITUTIONAL: Alert and Oriented x3, NAD, resting comfortably, well groomed  HEAD/FACE: Normocephalic, atraumatic. Swelling to right side of face from eye to jaw with erythema, right maxillary sinus TTP, right upper jaw TTP.   EYES: clear sclera and conjunctiva, PERRL, EOM intact.   ENT: B/L TM gray, + light reflex, no external ear pain, redness, or swelling. Mucus membranes moist. No epistaxis or nasal discharge. Uvula and tongue midline. Tonsils not enlarged. Tonsils and pharynx without erythema or exudate. Tooth #6 new implant with erythema and edema of the surrounding gums, tooth # 5 absent.   NECK:  Airway patent. Neck Supple. FROM without pain.   CARDIAC: Heart regular, normal S1/2, no murmurs noted. No chest wall tenderness or deformity.  RESPIRATORY: Lung sounds clear B/L, good aeration. No wheezing, rales, adventitious breath sounds. No accessory muscle use.    MSK: Moving all extremities.   NEURO: alert and interactive, cooperative, pleasant, oriented x3, no focal deficits.

## 2024-06-03 NOTE — ED PROVIDER NOTE - PATIENT PORTAL LINK FT
You can access the FollowMyHealth Patient Portal offered by [facility] by registering at the following website: [email or web address]. By joining [facility]’s FollowMyHealth portal, you will also be able to view your health information using other applications (apps) compatible with our system.

## 2024-06-03 NOTE — ED ADULT TRIAGE NOTE - CHIEF COMPLAINT QUOTE
Pt. c/o right sided dental pain with swelling and redness to face. Denies fevers. Implant placed to upper right side on 5/1.

## 2024-06-03 NOTE — PROGRESS NOTE ADULT - SUBJECTIVE AND OBJECTIVE BOX
Patient is a 78y old Female who presents with a chief complaint of facial swelling and tooth pain. Dental consulted to assess if odontogenic in origin.    Dental HPI: Pt had a dental implant placed 5/1 in their home state of [place]. States they were given clindamycin post-op but stopped taking 2 days later bc of stomach pain. Pain/swelling began on Saturday 6/1 and pt saw a private dentist in [place] that prescribed z-pack and pt states has not helped. Pt denies trouble breathing or swallowing at this time.      PAST MEDICAL & SURGICAL HISTORY:  Atrial fibrillation  Presence of Watchman left atrial appendage closure device  History of loop recorder  Asthma      Allergies    Cipro (Angioedema)  penicillin (Rash)    Intolerances    *Last Dental Visit: 6/1/2024    Vital Signs Last 24 Hrs  T(C): 36.7 (03 Jun 2024 17:46), Max: 36.7 (03 Jun 2024 17:46)  T(F): 98 (03 Jun 2024 17:46), Max: 98 (03 Jun 2024 17:46)  HR: 60 (03 Jun 2024 17:46) (60 - 70)  BP: 118/60 (03 Jun 2024 17:46) (107/68 - 120/62)  BP(mean): --  RR: 18 (03 Jun 2024 17:46) (16 - 18)  SpO2: 100% (03 Jun 2024 17:46) (96% - 100%)    Parameters below as of 03 Jun 2024 17:46  Patient On (Oxygen Delivery Method): room air        EOE:    Mandible FROM             Facial bones and MOM grossly intact             ( - ) trismus             ( - ) LAD             ( + ) swelling: right-sided facial swelling extending from right cheek up to inferior aspect of right orbit             ( + ) asymmetry: right-sided facial swelling             ( + ) palpation: right cheek             ( - ) SOB             ( - ) dysphagia             ( - ) LOC    IOE:  permanent dentition: grossly intact           hard/soft palate:  ( - ) palatal torus           tongue/FOM WNL           labial/buccal mucosa: right sided buccal mucosa slightly distended           ( - ) percussion           ( + ) palpation: buccal gingiva around implant site #4           ( - ) swelling     - Erythmatous gingiva around #4 implant healing cap with draining purulence noted from the mesial of tooth #3    CT Max/Face Impression:  IMPRESSION: There is extensive dental hardware causing streak artifact, which limits evaluation for abscess. Periodontal lucencies surrounding a right maxillary molar. Asymmetrically increased right facial subcutaneous soft tissue fat stranding suggesting cellulitis, without evidence of abscess.      DENTAL RADIOGRAPHS: PA radiograph taken and interpreted.  - implant site #4 with osseointegration in progress disto-apically and in coronal 1/3 of submerged implant body  - No PARLs    ASSESSMENT: No signs of fluctuant vestibular swelling noted. Purulent drainage along mesial aspect of tooth #3 likely related to a gingival infection involving marginal gingiva around #4 implant healing cap. No obvious signs of dental abscess or fistula at this time. Advised pt of tx via IV abx and f/u with outside dentist for re-evaluation of #4 dental implant health.    PROCEDURE:  Limited clinical and radiographic exam completed with pt verbal consent. All findings explained to pt and , all questions were answered. Recommended pt stay for multiple rounds of IV abx but pt declined and accepted 1 round of IV clindamycin with PO clindamycin to take at home. Pt stated they are going back to [place] in a few days and want to follow up with their dentist in [place] for further care and observation. Advised pt if facial swelling worsens or breathing/swallowing becomes impaired to return to the ED. Pt and  understood.    RECOMMENDATIONS:  1) IV Clindamycin and pain meds as per med team  2) Dental F/U with outpatient dentist for comprehensive dental care.   3) If any difficulty swallowing/breathing, fever occur, page dental.     Darlyn Arnold DDS #[id number]  Aileen Deras DDS #[id number]

## 2024-06-03 NOTE — ED PROVIDER NOTE - OBJECTIVE STATEMENT
Patient is 79yo F PMHx afib s/p watchman procedure, loop recorder, asthma presents with c/o right sided dental pain x2 weeks, worse x3 days. Patient reports she had a dental implant on 5/1 with her dentist in [place], reports two weeks later she developed pain to the area, was prescribed a medrol dose pack, and her pain resolved. Patient states she is in [place] visiting her daughter now, reports on Saturday she developed severe pain in the same area, went to urgent care who prescribed her azithromycin z-landy which she has taken two days of. Patient states the pain and swelling has gotten worse today now also pain in the right ear so she was told to come to the ER for further evaluation. Patient denies fevers or chills, difficulty swallowing, eye pain or vision changes, throat pain, HA, dizziness, CP, or SOB. Patient reports last Advil 10:30am this morning.

## 2024-11-05 NOTE — ED PROVIDER NOTE - NS_EDPROVIDERDISPOUSERTYPE_ED_A_ED
I have personally evaluated and examined the patient. The Attending was available to me as a supervising provider if needed.
3